# Patient Record
Sex: FEMALE
[De-identification: names, ages, dates, MRNs, and addresses within clinical notes are randomized per-mention and may not be internally consistent; named-entity substitution may affect disease eponyms.]

---

## 2023-04-07 PROBLEM — Z00.00 ENCOUNTER FOR PREVENTIVE HEALTH EXAMINATION: Status: ACTIVE | Noted: 2023-04-07

## 2023-04-10 ENCOUNTER — APPOINTMENT (OUTPATIENT)
Dept: RHEUMATOLOGY | Facility: CLINIC | Age: 58
End: 2023-04-10

## 2023-04-24 ENCOUNTER — APPOINTMENT (OUTPATIENT)
Dept: RHEUMATOLOGY | Facility: CLINIC | Age: 58
End: 2023-04-24

## 2024-09-06 ENCOUNTER — APPOINTMENT (OUTPATIENT)
Dept: NEUROLOGY | Facility: CLINIC | Age: 59
End: 2024-09-06
Payer: COMMERCIAL

## 2024-09-06 VITALS
DIASTOLIC BLOOD PRESSURE: 64 MMHG | SYSTOLIC BLOOD PRESSURE: 102 MMHG | OXYGEN SATURATION: 98 % | HEART RATE: 70 BPM | HEIGHT: 61 IN | BODY MASS INDEX: 22.66 KG/M2 | WEIGHT: 120 LBS

## 2024-09-06 DIAGNOSIS — Z86.39 PERSONAL HISTORY OF OTHER ENDOCRINE, NUTRITIONAL AND METABOLIC DISEASE: ICD-10-CM

## 2024-09-06 DIAGNOSIS — R51.9 HEADACHE, UNSPECIFIED: ICD-10-CM

## 2024-09-06 DIAGNOSIS — R42 DIZZINESS AND GIDDINESS: ICD-10-CM

## 2024-09-06 PROCEDURE — 99204 OFFICE O/P NEW MOD 45 MIN: CPT

## 2024-09-06 RX ORDER — LEVOTHYROXINE SODIUM 0.15 MG/1
150 TABLET ORAL
Refills: 0 | Status: ACTIVE | COMMUNITY

## 2024-09-06 RX ORDER — MECLIZINE HCL 25 MG/1
25 TABLET ORAL 3 TIMES DAILY
Qty: 30 | Refills: 0 | Status: ACTIVE | COMMUNITY
Start: 2024-09-06 | End: 1900-01-01

## 2024-09-06 RX ORDER — AMITRIPTYLINE HYDROCHLORIDE 10 MG/1
10 TABLET, FILM COATED ORAL
Qty: 30 | Refills: 5 | Status: ACTIVE | COMMUNITY
Start: 2024-09-06 | End: 1900-01-01

## 2024-09-06 NOTE — HISTORY OF PRESENT ILLNESS
[FreeTextEntry1] :  [facility] NEUROLOGY AT [place]  CC: Dizziness, Balance HPI: 60 y/o F with hx of Hypothyroidism presents for evaluation of dizziness and headaches.   Has multiple complaints.  Complains of Left sided headaches for past 6 months, intermittent that involve the L scalp and can be associated with lacrimation of the left eye, nausea.   Current frequency is 2-3 times a week at night, but no issues during the day. HA resolved with sleep or tylenol.   Also recalls having similar headaches when she was younger, never diagnosed with migraines.   Currently finished with menopause.  Sleeps 7-8 hours/night.    States two weeks ago, she has been having sensation of dizziness and heavy sensation in the head.  Dizziness described as "moving to the right side."  Dizziness improved but still has it occasionally when changing positions--mostly feels it likely is heavy in the head.   Denies any associated diplopia, dysarthria, dysphagia, numbness, tingling,  weakness, difficulty walking, vision changes or other focal deficits. States she recently fell and while getting up, hit head in the corner of the bathroom on the left side of her head, so now has a tender point on her scalp.

## 2024-09-06 NOTE — REASON FOR VISIT
[Time Spent: ____ minutes] : Total time spent using  services: [unfilled] minutes. The patient's primary language is not English thus required  services. [Interpreters_IDNumber] : [id number] [TWNoteComboBox1] : [place]ian

## 2024-09-06 NOTE — PHYSICAL EXAM
[FreeTextEntry1] :   General: Cooperative, NAD HEENT: NC/AT, no carotid bruits, tender in the left frontal scalp region Lungs: CTAB Chest: RRR, no murmurs Extremities: nontender, no erythema Neurological Examination:  MS: AOx3. Appropriately interactive, normal affect. Speech fluent w/o paraphasic errors CN: PERLL, EOMI, V1-3 sensation intact, face symmetric, hearing intact, palate elevates symmetrically, tongue midline, SCM equal bilaterally Motor: normal bulk and tone, no tremor, rigidity or bradykinesia.  5/5 all over Sens: Intact to light touch. Reflexes: 2/4 all over, downgoing toes b/l Coord:  No dysmetria, PATRICK intact Gait: Normal +Head thrust test

## 2024-09-06 NOTE — ASSESSMENT
[FreeTextEntry1] :  60 y/o F with hx of hypothyroidism presents for evaluation of dizziness and headaches. HA are suspicious for migraines without aura.  Dizziness suspicious for peripheral vertigo--reproducible on exam.    Migraine without Aura - Hold off on neuroimaging for now - Start Elavil 10mg qhs, R/A/B discussed - Headache diary - Avoidance of triggers  Vertigo - Meclizine 25mg TID prn - If recurs, can consider vestibular PT  - Follow up in 2 months

## 2024-10-07 ENCOUNTER — RX CHANGE (OUTPATIENT)
Age: 59
End: 2024-10-07

## 2024-10-07 RX ORDER — AMITRIPTYLINE HYDROCHLORIDE 10 MG/1
10 TABLET, FILM COATED ORAL
Qty: 90 | Refills: 2 | Status: ACTIVE | COMMUNITY
Start: 1900-01-01 | End: 1900-01-01

## 2024-10-10 ENCOUNTER — APPOINTMENT (OUTPATIENT)
Dept: RHEUMATOLOGY | Facility: CLINIC | Age: 59
End: 2024-10-10

## 2024-10-24 ENCOUNTER — APPOINTMENT (OUTPATIENT)
Dept: RHEUMATOLOGY | Facility: CLINIC | Age: 59
End: 2024-10-24

## 2024-11-18 ENCOUNTER — APPOINTMENT (OUTPATIENT)
Dept: NEUROLOGY | Facility: CLINIC | Age: 59
End: 2024-11-18

## 2025-07-15 ENCOUNTER — RX RENEWAL (OUTPATIENT)
Age: 60
End: 2025-07-15

## 2025-09-02 ENCOUNTER — RESULT REVIEW (OUTPATIENT)
Age: 60
End: 2025-09-02